# Patient Record
Sex: FEMALE | Race: BLACK OR AFRICAN AMERICAN
[De-identification: names, ages, dates, MRNs, and addresses within clinical notes are randomized per-mention and may not be internally consistent; named-entity substitution may affect disease eponyms.]

---

## 2017-02-20 ENCOUNTER — HOSPITAL ENCOUNTER (EMERGENCY)
Dept: HOSPITAL 25 - UCEAST | Age: 32
Discharge: HOME | End: 2017-02-20
Payer: COMMERCIAL

## 2017-02-20 VITALS — DIASTOLIC BLOOD PRESSURE: 81 MMHG | SYSTOLIC BLOOD PRESSURE: 118 MMHG

## 2017-02-20 DIAGNOSIS — F17.210: ICD-10-CM

## 2017-02-20 DIAGNOSIS — J32.9: ICD-10-CM

## 2017-02-20 DIAGNOSIS — H10.32: Primary | ICD-10-CM

## 2017-02-20 PROCEDURE — G0463 HOSPITAL OUTPT CLINIC VISIT: HCPCS

## 2017-02-20 PROCEDURE — 99212 OFFICE O/P EST SF 10 MIN: CPT

## 2017-09-04 ENCOUNTER — HOSPITAL ENCOUNTER (EMERGENCY)
Dept: HOSPITAL 25 - UCEAST | Age: 32
Discharge: HOME | End: 2017-09-04
Payer: SELF-PAY

## 2017-09-04 VITALS — DIASTOLIC BLOOD PRESSURE: 73 MMHG | SYSTOLIC BLOOD PRESSURE: 119 MMHG

## 2017-09-04 DIAGNOSIS — S00.81XA: ICD-10-CM
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
## 2017-09-04 DIAGNOSIS — S40.011A: Primary | ICD-10-CM

## 2017-09-04 DIAGNOSIS — F17.210: ICD-10-CM

## 2017-09-04 DIAGNOSIS — S20.319A: ICD-10-CM

## 2017-09-04 DIAGNOSIS — S40.211A: ICD-10-CM

## 2017-09-04 DIAGNOSIS — S01.511A: ICD-10-CM
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
## 2017-09-04 PROCEDURE — 99213 OFFICE O/P EST LOW 20 MIN: CPT

## 2017-09-04 PROCEDURE — G0463 HOSPITAL OUTPT CLINIC VISIT: HCPCS

## 2017-09-04 NOTE — RAD
Indication: RIGHT shoulder pain post fall.



Comparison: No relevant prior exams available on the [facility] [place]ACS for comparison.



Technique: Internal rotation AP, external rotation Grashey, scapular Y, axillary views

RIGHT shoulder



Report: Normal acromioclavicular and glenohumeral joint alignment. Transverse gap at the

acromioclavicular joint is upper normal. Negative for fracture. No significant

arthropathic change evident. Unremarkable soft tissue contours.



IMPRESSION: Negative for fracture or dislocation.
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
## 2017-11-26 ENCOUNTER — HOSPITAL ENCOUNTER (EMERGENCY)
Dept: HOSPITAL 25 - UCEAST | Age: 32
Discharge: HOME | End: 2017-11-26
Payer: SELF-PAY

## 2017-11-26 VITALS — SYSTOLIC BLOOD PRESSURE: 112 MMHG | DIASTOLIC BLOOD PRESSURE: 63 MMHG
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
## 2017-11-26 DIAGNOSIS — Z72.0: Primary | ICD-10-CM
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
## 2017-11-26 DIAGNOSIS — S90.32XA: ICD-10-CM

## 2017-11-26 PROCEDURE — G0463 HOSPITAL OUTPT CLINIC VISIT: HCPCS

## 2017-11-26 PROCEDURE — 99213 OFFICE O/P EST LOW 20 MIN: CPT

## 2017-11-26 NOTE — UC
Lower Extremity/Ankle HPI





- HPI Summary


HPI Summary: 





Pt presents with left foot pain. 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 

seat a patient in a wheelchair. At some point during this process, the patient 

moved and the wheelchair hit her left foot and pinched it. She was able to walk 

on it and carried on with the rest of her shift, but today had increased pain 

and swelling at the base of her 2nd and 3rd digits. Has been icing the foot. 

Nothing OTC for pain. Denies previous injury, numbness, or tingling.





- History of Current Complaint


Chief Complaint: UCLowerExtremity


Stated Complaint: SWOLLEN TOE


Time Seen by Provider: 11/26/17 15:35


Hx Obtained From: Patient


Hx Last Menstrual Period: 10/28/17


Pregnant?: No


Onset/Duration: Sudden Onset


Severity Initially: Mild


Severity Currently: Moderate


Pain Intensity: 7


Pain Scale Used: 0-10 Numeric


Aggravating Factor(s): Standing, Ambulation


Alleviating Factor(s): Rest, Elevation


Able to Bear Weight: Yes





- Allergies/Home Medications


Allergies/Adverse Reactions: 


 Allergies











Allergy/AdvReac Type Severity Reaction Status Date / Time


 


Shrimp Flavor Allergy  Anaphylatic Verified 11/26/17 15:33





   Shock  














PMH/Surg Hx/FS Hx/Imm Hx


Previously Healthy: Yes





- Surgical History


Surgical History: Yes


Surgery Procedure, Year, and Place: tubal ligation





- Family History


Known Family History: Positive: None


   Negative: Respiratory Disease





- Social History


Occupation: Employed Full-time


Lives: With Family


Alcohol Use: Rare


Substance Use Type: None


Smoking Status (MU): Light Every Day Tobacco Smoker


Type: Cigarettes


Amount Used/How Often: 1pk/week


Have You Smoked in the Last Year: Yes


Household Exposure Type: Cigarettes


Cessation Counseling: Counseled 3+Min - 10 Min





Review of Systems


Constitutional: Negative


Skin: Negative


Respiratory: Negative


Cardiovascular: Negative


Neurovascular: Negative


Musculoskeletal: Other: - Pain left forefoot.


Neurological: Negative


Psychological: Negative


All Other Systems Reviewed And Are Negative: Yes





Physical Exam


Triage Information Reviewed: Yes


Appearance: Well-Appearing, Well-Nourished


Vital Signs: 


 Initial Vital Signs











Temp  98 F   11/26/17 15:28


 


Pulse  91   11/26/17 15:28


 


Resp  17   11/26/17 15:28


 


BP  112/63   11/26/17 15:28


 


Pulse Ox  100   11/26/17 15:28











Vital Signs Reviewed: Yes


Musculoskeletal: Positive: Strength Intact, ROM Intact, No Edema, Other: - Left 

foot: TTP at the base of the 2nd and 3rd digits. No obvious bony deformities. 

Mild pain with toe extension over the base of 2nd and 3rd.


Neurological: Positive: Alert, Other: - Sensations intact b/l feet and all 

digits.


Psychological: Positive: Age Appropriate Behavior


Skin: Negative: rashes





Lower Extremity Course/Dx





- Course


Course Of Treatment: Prior to imaging study, a pregnancy test was offered and 

potential risks to a fetus were discussed - pt declined pregnancy test.  XR: 

IMPRESSION: NO EVIDENCE FOR FRACTURE.  Crutches. WBOT. ACE wrap. Ibuprofen for 

pain. No work today.





- Differential Dx/Diagnosis


Differential Diagnosis/HQI/PQRI: Contusion, Dislocation, Fracture (Closed), 

Sprain, Strain


Provider Diagnoses: Foot contusion





Discharge





- Discharge Plan


Condition: Stable


Disposition: HOME


Patient Education Materials:  Foot Contusion (ED)


Referrals: 


Tariq Barbour MD [Primary Care Provider] - 


Kahlil Lyle MD [Medical Doctor] - If Needed


Additional Instructions: 








1) Rest, Elevate, Wrap, and Ice your foot.


2) No work for today. Weight bear and walk as tolerated.


3) Ibuprofen OTC for pain





If you develop a fever, SOB, chest pain, new or worsening symptoms - please 

call your PCP or go to the ED.

## 2017-11-26 NOTE — RAD
INDICATION:  Left foot injury.



TECHNIQUE: 3 views of the left foot were obtained.



FINDINGS: The bones are in normal alignment. No fracture is seen.  Joint spaces appear

maintained.

  

IMPRESSION:  NO EVIDENCE FOR FRACTURE, IF THE PATIENT'S SYMPTOMS PERSIST RECOMMEND

FOLLOW-UP IMAGING.

## 2020-04-16 ENCOUNTER — HOSPITAL ENCOUNTER (OUTPATIENT)
Dept: HOSPITAL 25 - OR | Age: 35
Discharge: HOME | End: 2020-04-16
Attending: ORTHOPAEDIC SURGERY
Payer: COMMERCIAL

## 2020-04-16 VITALS — SYSTOLIC BLOOD PRESSURE: 119 MMHG | DIASTOLIC BLOOD PRESSURE: 69 MMHG

## 2020-04-16 DIAGNOSIS — F17.210: ICD-10-CM

## 2020-04-16 DIAGNOSIS — S93.492A: ICD-10-CM

## 2020-04-16 DIAGNOSIS — S92.142A: Primary | ICD-10-CM

## 2020-04-16 DIAGNOSIS — E66.01: ICD-10-CM
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
## 2020-04-16 DIAGNOSIS — G89.18: ICD-10-CM

## 2020-04-16 PROCEDURE — C1713 ANCHOR/SCREW BN/BN,TIS/BN: HCPCS

## 2020-04-16 PROCEDURE — 76000 FLUOROSCOPY <1 HR PHYS/QHP: CPT

## 2020-04-16 PROCEDURE — C1776 JOINT DEVICE (IMPLANTABLE): HCPCS

## 2020-04-16 NOTE — OP
Operative Report - Blank





- Operative Report


Date of Operation: 04/16/20


Note: 


PATIENT: Lary Short





YOB: 1985





DATE OF SURGERY: 4/16/2020





SURGEON: Kahlil Lyle MD





ASSISTANT: UTE Taylor, whos assistance was necessary for positioning, 

retraction, help with instrumentation, and closure.





ANESTHESIOLOGIST: Dr. Esquivel





PREOPERATIVE DIAGNOSIS: Left displaced lateral talar dome osteochondral 

fracture and ankle sprain 





POSTOPERATIVE DIAGNOSIS: Left displaced lateral talar dome osteochondral 

fracture and ankle sprain





OPERATION: 


1. Left open reduction and internal fixation of displaced lateral talar dome 

osteochondral fracture.


2. Left ankle modified Brostrom procedure lateral ligament reconstruction.





ANESTHESIA: Spinal + block





IMPLANTS: Arthrex chondral darts x3





TOURNIQUET TIME: Less than 2 hours with a well-padded thigh tourniquet at 275 

mmHg





SPECIMENS: none





ESTIMATED BLOOD LOSS: minimal





COMPLICATIONS: none





STATUS: Stable from the operating room to the recovery room and then home.





INDICATIONS FOR PROCEDURE:


Lary, sustained a left ankle inversion injury and a displaced fracture of 

her lateral talar dome.  Both operative and non operative treatment 

alternatives were reviewed. Further, the nature and risks of surgery were 

reviewed in careful detail, in the office as well as the pre-operative holding 

area. Our discussions regarding the risks of surgery included, but were not 

limited to, infection, wound problems, nerve injury, neuroma, RSD, persistent 

symptoms, nonunion, malunion, arthritis, recurrent instability, blood clot, 

failure of the surgery, and even the remote chance of catastrophic complication.





DESCRIPTION OF PROCEDURE:


The patient was seen in the preoperative holding unit and informed written 

consent was obtained.  The appropriate extremity was marked. The patient was 

then brought to the operating room and carefully positioned on the operating 

room table. Anesthesia was induced. All bony prominences were padded with great 

care. A chlorhexidine based pre-scrub was performed followed by a chloraprep 

prep and drape in standard sterile fashion. A surgical safety pause was then 

conducted in which we confirmed the appropriate patient, extremity, planned 

procedure, availability of equipment, indication and administration of 

prophylactic antibiotics, and DVT prophylaxis in the form of a compression boot 

on the non-surgical extremity. 





I began with an Esmarch exsanguination of limb and inflated the tourniquet.  I 

made a longitudinal incision overlying the distal fibula.  I dissected down to 

the layer of the periosteum.  I then dissected anteriorly to expose the 

anterolateral ankle ligaments.  We protected the superficial peroneal nerve at 

all times, which was not visualized within our field.  Once we had adequately 

exposed a pocket anterior to the ligaments, we sharply took the ligaments down 

off of the anterior and distal aspect of the fibula using a 15 blade. The 

inferior extensor retinaculum was exposed and protected for subsequent repair 

later in the procedure. 





I then entered the lateral ankle joint.  The osteochondral fracture was 

immediately identified and had flipped 180 degrees, as expected.  I removed the 

osteochondral fracture to expose the fracture bed.  There is little bit of 

fibrinous material which was removed with a curette.  A little bit of 

overhanging cartilage was excised.  I then reduced the fracture fragment back 

to the fracture bed.  A small K wire was placed to hold this provisionally.  I 

then used 3 Arthrex chondral darts to secure the fractured fragment back to the 

rest of the talus.  The provisional K wire was removed and the fracture 

remained well reduced and securely fixated.  I then used fluoroscopy to confirm 

the reduction.


  


I then utilized a rongeur to make a trough along the fibula to receive the 

reconstructed ligaments.  I then utilized K-wires to drill holes in the fibula 

for a transosseous suture repair of the lateral ligaments utilizing a 

horizontal mattress suture.  Multiple #1 Vicryl sutures were passed through the 

fibula and then through the ligaments and then back through the fibula.  These 

sutures were all passed with great care taken to appropriately tension both the 

ATFL as well as the CFL in order to get a nice tight repair.  We held the ankle 

in a dorsiflexed and everted position while the ligaments and sutures were tied 

down over the fibular bone bridges.  These held the ankle in a much improved 

position with excellent tension on the ligaments.  We then utilized a 

rotational flap from the periosteum overlying the distal fibula to augment the 

repair.  This was sewn down using a horizontal mattress stich overlying the 

ATFL. We further augmented the repair by bringing the inferior extensor 

retinaculum up to the fibula. The wound was copiously irrigated.





We then irrigated the wound copiously again. The wound was closed in a layered 

fashion utilizing 3-0 Monocryl and 3-0 nylon. A sterile dressing was then 

applied and the ankle was splinted in a neutral position. All needle and sponge 

counts were correct at the end of the case. The patient was awakened from 

anesthesia and transferred to the recovery room in stable condition. There were 

no complications.





ATTESTATION: I attest I was present and scrubbed and performed the critical 

portions of the procedure myself.





POST-OPERATIVE PLAN: The patient will remain non-weight-bearing for an 

anticipated duration of 6 weeks. Follow up will be in 2 weeks for likely suture 

removal and transition into a short leg cast.

## 2021-08-18 NOTE — UC
DATE & TIME: 8/17/21 [id number]  Cognitive Concerns/ Orientation: Alert and oriented x 3, Forgetful.  BEHAVIOR & AGGRESSION TOOL COLOR: Green     ABNL VS/O2: VSS on RA.   MOBILITY: Ax1 GB/W. Up to bedside commode and BR.  PAIN MANAGMENT: Scheduled Voltaren gel and lidocaine patches. No patch in place this shift  DIET: Regular diet. Assistance needed with ordering.   BOWEL/BLADDER: Inc of B/B. Refusing purewick.    SKIN: Trace edema to BLE. Pale. Scattered bruising.   D/C DAY/GOALS/PLACE: Placement at [facility] in [place]. Pt was supposed to discharge at 0600 8/18 however son missed flight and discharge date tentatively changed to 8/19 at 0600. So pt can catch flight to [place] with son Philip.   OTHER IMPORTANT INFO: No changes this shift.     Eye Complaint HPI





- HPI Summary


HPI Summary: 





ONE WEEK OF CONGESTION COLD SYMPTOMS, LAST TWO DAYS HAS HAD LEFT EYE IRRITATION

, BURNING AND DRAINAGE AND DISCHARGE. 





- History of Current Complaint


Chief Complaint: UCEye


Stated Complaint: EYE INFECTION


Time Seen by Provider: 02/20/17 11:56


Hx Obtained From: Patient


Hx Last Menstrual Period: depo shots - last period in april


Onset/Duration: Gradual Onset, Lasting Weeks, Worse Since - LAST TWO DAYS


Timing: Weeks


Severity Initially: Mild


Severity Currently: Moderate


Location of Injury: Conjunctiva


Character: Dull


Aggravating Factor(s): Nothing


Alleviating Factor(s): Nothing


Associated Signs And Symptoms: Positive: Drainage (Clear), Drainage (Purulent).

  Negative: Photophobia, Vision Impairment Bilateral, Vision Impairment Right, 

Vision Impairment Left, Fever, Swelling





- Risk Factors


Globe Rupture Risk Factors: Negative


Acute Glaucoma Risk Factors: Negative


Optic Artery Occlusion Risk Factors: Negative





- Allergies/Home Medications


Allergies/Adverse Reactions: 


 Allergies











Allergy/AdvReac Type Severity Reaction Status Date / Time


 


Shrimp Flavor Allergy  Anaphylatic Verified 05/10/16 07:50





   Shock  














PMH/Surg Hx/FS Hx/Imm Hx


Previously Healthy: Yes


Endocrine History Of: 


   Denies: Diabetes, Thyroid Disease


Cardiovascular History Of: 


   Denies: Cardiac Disorders, Hypertension


Respiratory History Of: 


   Denies: COPD, Asthma


GI/ History Of: 


   Denies: Ulcer





- Surgical History


Surgical History: Yes


Surgery Procedure, Year, and Place: tubal ligation





- Family History


Known Family History: 


   Negative: Respiratory Disease





- Social History


Occupation: Employed Full-time


Alcohol Use: Rare


Substance Use Type: None


Smoking Status (MU): Light Every Day Tobacco Smoker


Type: Cigarettes


Amount Used/How Often: 1pk/week


Cessation Counseling: Patient Advised to Stop





Review of Systems


Constitutional: Negative


Skin: Negative


Eyes: Drainage, Eye Redness


ENT: Ear Ache, Nasal Discharge


Respiratory: Cough


Cardiovascular: Negative


Gastrointestinal: Negative


Genitourinary: Negative


Motor: Negative


Neurovascular: Negative


Musculoskeletal: Negative


Neurological: Negative


Psychological: Negative


All Other Systems Reviewed And Are Negative: Yes





Physical Exam


Triage Information Reviewed: Yes


Appearance: Well-Appearing, No Pain Distress, Well-Nourished


Vital Signs: 


 Initial Vital Signs











Temp  97.8 F   02/20/17 11:56


 


Pulse  87   02/20/17 11:56


 


Resp  18   02/20/17 11:56


 


BP  118/81   02/20/17 11:56


 


Pulse Ox  100   02/20/17 11:56











Vital Signs Reviewed: Yes


Eyes: Positive: Conjunctiva Inflamed, Discharge


ENT: Positive: Hearing grossly normal, Pharynx normal, TM bulging, TM dull, 

Other: - CERUMEN IMPACTION BILATERAL L>R


Dental Exam: Normal


Neck exam: Normal


Neck: Positive: Supple, Nontender, No Lymphadenopathy


Respiratory Exam: Normal


Respiratory: Positive: Chest non-tender, Lungs clear, Normal breath sounds, No 

respiratory distress, No accessory muscle use


Cardiovascular Exam: Normal


Cardiovascular: Positive: RRR, No Murmur, Pulses Normal


Abdominal Exam: Normal


Abdomen Description: Positive: Nontender, No Organomegaly


Musculoskeletal Exam: Normal


Neurological Exam: Normal


Psychological Exam: Normal


Psychological: Positive: Normal Response To Family


Skin Exam: Normal





Eye Complaint Course/Dx





- Differential Dx/Diagnosis


Differential Diagnosis/HQI/PQRI: Conjunctivitis


Provider Diagnoses: LEFT CONJUNCTIVITIS.  SINUSITIS





Discharge





- Discharge Plan


Condition: Stable


Disposition: HOME


Prescriptions: 


Amoxicillin/Clavulanate TAB* [Augmentin *] 875 mg PO BID #20 tab


Neomycin/Polymy/Dex OPTH.SUSP* [Maxitrol Opth Susp 0.1%*] 1 drop LEFT EYE TID #

1 bottle


Patient Education Materials:  Sinusitis (ED), Cerumen Impaction (ED), 

Conjunctivitis (ED)


Referrals: 


Tariq Barbour MD [Primary Care Provider] -